# Patient Record
Sex: MALE | Race: BLACK OR AFRICAN AMERICAN | Employment: FULL TIME | URBAN - METROPOLITAN AREA
[De-identification: names, ages, dates, MRNs, and addresses within clinical notes are randomized per-mention and may not be internally consistent; named-entity substitution may affect disease eponyms.]

---

## 2021-01-27 ENCOUNTER — VIRTUAL VISIT (OUTPATIENT)
Dept: FAMILY MEDICINE | Facility: CLINIC | Age: 36
End: 2021-01-27
Payer: COMMERCIAL

## 2021-01-27 DIAGNOSIS — U07.1 COVID-19: Primary | ICD-10-CM

## 2021-01-27 PROCEDURE — 99213 OFFICE O/P EST LOW 20 MIN: CPT | Mod: 95 | Performed by: FAMILY MEDICINE

## 2021-01-27 NOTE — LETTER
January 27, 2021      Jovon Garcia  [address]   [facility] [address]        To Whom It May Concern:    Jovon Garcia was seen in our clinic virtually. He may return to work without restrictions on 2/1/2021. His quarantine ends on 1/27/2021 but he is still having symptoms and OK to wait until next week before he can return to work.    Let me know if you have any question.       Sincerely,      Johnnie Almonte MD

## 2021-01-27 NOTE — PATIENT INSTRUCTIONS
We are working hard to begin vaccinating more people against COVID-19. Currently, we are only vaccinating Phase 1a workers - healthcare workers who are unable to do their job remotely. Vaccine availability is very limited.      If you are a healthcare worker and you are unable to do your job remotely, please log in to [employer] using this link to see if we have openings and schedule an appointment. At your vaccine appointment, you will be asked to provide proof of employment as a health care worker. If you cannot, you will be turned away.     Vaccine appointments are being added as they become available. Please check your [employer] account frequently for availability.  If you have technical difficulty using [employer], call [phone] for assistance.     You can learn more about the state's phased approach to administering the vaccine, with details on each phase, here.      Phase 1b is the next group that will get vaccinated and includes frontline essential workers and adults 75 years of age and older. When we are able to start vaccinating this group, we will share that information on our website. Check this website to stay up to date on COVID-19 vaccination information.        Did you know?      You can schedule a video visit for follow-up appointments as well as future appointments for certain conditions.  Please see the below link.     [email or web address]    If you have not already done so,  I encourage you to sign up for [employer] ([email or web address]).  This will allow you to review your results, securely communicate with a provider, and schedule virtual visits as well.

## 2021-01-27 NOTE — PROGRESS NOTES
Jovon is a 35 year old who is being evaluated via a billable telephone visit.          Assessment & Plan     COVID-19  From his history. He is feeling overall well but still not back to baseline.  Still having fatigue, cough, muscle ache.  Today would be his 14-day of quarantine and needs a note for work.  He is wondering if he can stay a few more days home.  I think it is an appropriate plan and he can return to work on February 1 as long as he is clinically improving.  He agreed.  We will email the letter as per his request -understands HIPPA compliance.     Johnnie Almonte MD, MD  [facility]    Emily Sigala is a 35 year old who presents to clinic today for the following health issues     HPI       Need letter to return to work. Today is his 14 th day of quarantine.     - had covid test on January 13th which was positive on January 15th- it was positive. Father in law was positive and patient, wife and son - all were positive. Patient had saliva test.   Was having loose stool, headache, runny nose, back pain and muscle ache with cough.   Right now doing well - having mild bodyache and fatigue.   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   No fever.   Today would be 14th day but feels bodyache and fatigue and wondering if he can go back to work on 2/1/2021.    Would like to get a note  Via email - [email or web address]            Objective           Vitals:  No vitals were obtained today due to virtual visit.    Physical Exam   healthy, alert and no distress  PSYCH: Alert and oriented times 3; coherent speech, normal   rate and volume, able to articulate logical thoughts, able   to abstract reason, no tangential thoughts, no hallucinations   or delusions  His affect is normal  RESP: No cough, no audible wheezing, able to talk in full sentences  Remainder of exam unable to be completed due to telephone visits                 Phone call duration: 9 minutes